# Patient Record
Sex: FEMALE | Race: WHITE | URBAN - METROPOLITAN AREA
[De-identification: names, ages, dates, MRNs, and addresses within clinical notes are randomized per-mention and may not be internally consistent; named-entity substitution may affect disease eponyms.]

---

## 2020-07-31 ENCOUNTER — OFFICE VISIT (OUTPATIENT)
Dept: URBAN - METROPOLITAN AREA CLINIC 71 | Facility: CLINIC | Age: 75
End: 2020-07-31
Payer: COMMERCIAL

## 2020-07-31 DIAGNOSIS — Z96.1 PRESENCE OF INTRAOCULAR LENS: ICD-10-CM

## 2020-07-31 DIAGNOSIS — H43.813 VITREOUS DEGENERATION, BILATERAL: ICD-10-CM

## 2020-07-31 PROCEDURE — 99204 OFFICE O/P NEW MOD 45 MIN: CPT | Performed by: OPTOMETRIST

## 2020-07-31 PROCEDURE — 92134 CPTRZ OPH DX IMG PST SGM RTA: CPT | Performed by: OPTOMETRIST

## 2020-07-31 RX ORDER — AMLODIPINE BESYLATE 2.5 MG/1
2.5 MG TABLET ORAL
Qty: 0 | Refills: 0 | Status: ACTIVE
Start: 2020-07-31

## 2020-07-31 RX ORDER — ATORVASTATIN CALCIUM 20 MG/1
20 MG TABLET, FILM COATED ORAL
Qty: 0 | Refills: 0 | Status: ACTIVE
Start: 2020-07-31

## 2020-07-31 RX ORDER — BUPROPION HYDROCHLORIDE 300 MG/1
300 MG TABLET, EXTENDED RELEASE ORAL
Qty: 0 | Refills: 0 | Status: ACTIVE
Start: 2020-07-31

## 2020-07-31 RX ORDER — LOSARTAN POTASSIUM 50 MG/1
50 MG TABLET ORAL
Qty: 0 | Refills: 0 | Status: ACTIVE
Start: 2020-07-31

## 2020-07-31 ASSESSMENT — INTRAOCULAR PRESSURE
OS: 10
OD: 10

## 2020-07-31 NOTE — IMPRESSION/PLAN
Impression: Puckering of macula, bilateral: H35.373. trace OD. significant but decentered OS Plan: Monitor. Call if worsens.

## 2020-07-31 NOTE — IMPRESSION/PLAN
Impression: Presence of intraocular lens: Z96.1. MF IOL OU. 1+ partial PC haze OD. s/p YAG OS. Plan: Monitor. call if worsens.

## 2021-09-24 ENCOUNTER — OFFICE VISIT (OUTPATIENT)
Dept: URBAN - METROPOLITAN AREA CLINIC 71 | Facility: CLINIC | Age: 76
End: 2021-09-24
Payer: COMMERCIAL

## 2021-09-24 DIAGNOSIS — H35.373 PUCKERING OF MACULA, BILATERAL: Primary | ICD-10-CM

## 2021-09-24 PROCEDURE — 92134 CPTRZ OPH DX IMG PST SGM RTA: CPT | Performed by: OPTOMETRIST

## 2021-09-24 PROCEDURE — 92014 COMPRE OPH EXAM EST PT 1/>: CPT | Performed by: OPTOMETRIST

## 2021-09-24 ASSESSMENT — INTRAOCULAR PRESSURE
OS: 9
OD: 9

## 2021-09-24 ASSESSMENT — KERATOMETRY
OS: 44.75
OD: 44.25

## 2021-09-24 NOTE — IMPRESSION/PLAN
Impression: Puckering of macula, bilateral: H35.373. trace OD. significant but decentered OS. OCT 09/24/21: stable OU (improved OS). Plan: Monitor. Call if worsens.

## 2021-09-24 NOTE — IMPRESSION/PLAN
Impression: Vitreous degeneration, bilateral: H43.813.  Stable OU Plan: Continue to monitor with DE yearly

## 2021-09-24 NOTE — IMPRESSION/PLAN
Impression: Presence of intraocular lens: Z96.1. MF IOL OU. 1+ partial PC haze OD. s/p YAG OS. Plan: Continue to monitor. Call if vision worsens.